# Patient Record
Sex: MALE | Race: WHITE | Employment: FULL TIME | URBAN - METROPOLITAN AREA
[De-identification: names, ages, dates, MRNs, and addresses within clinical notes are randomized per-mention and may not be internally consistent; named-entity substitution may affect disease eponyms.]

---

## 2024-09-27 ENCOUNTER — HOSPITAL ENCOUNTER (OUTPATIENT)
Dept: PHYSICAL THERAPY | Age: 29
Setting detail: THERAPIES SERIES
Discharge: HOME | End: 2024-09-27
Payer: COMMERCIAL

## 2024-09-27 ENCOUNTER — OFFICE VISIT (OUTPATIENT)
Dept: ORTHOPEDIC SURGERY | Age: 29
End: 2024-09-27
Payer: COMMERCIAL

## 2024-09-27 VITALS — WEIGHT: 170 LBS | HEIGHT: 71 IN | BODY MASS INDEX: 23.8 KG/M2

## 2024-09-27 DIAGNOSIS — S83.271D COMPLEX TEAR OF LATERAL MENISCUS OF RIGHT KNEE, SUBSEQUENT ENCOUNTER: Primary | ICD-10-CM

## 2024-09-27 PROCEDURE — 97530 THERAPEUTIC ACTIVITIES: CPT | Performed by: PHYSICAL THERAPIST

## 2024-09-27 PROCEDURE — 97112 NEUROMUSCULAR REEDUCATION: CPT | Performed by: PHYSICAL THERAPIST

## 2024-09-27 PROCEDURE — 99212 OFFICE O/P EST SF 10 MIN: CPT | Performed by: ORTHOPAEDIC SURGERY

## 2024-09-27 PROCEDURE — 97110 THERAPEUTIC EXERCISES: CPT | Performed by: PHYSICAL THERAPIST

## 2024-09-30 ENCOUNTER — APPOINTMENT (OUTPATIENT)
Dept: PHYSICAL THERAPY | Age: 29
End: 2024-09-30
Payer: COMMERCIAL

## 2024-10-10 ENCOUNTER — HOSPITAL ENCOUNTER (OUTPATIENT)
Dept: PHYSICAL THERAPY | Age: 29
Setting detail: THERAPIES SERIES
Discharge: HOME | End: 2024-10-10
Payer: COMMERCIAL

## 2024-10-10 PROCEDURE — 97530 THERAPEUTIC ACTIVITIES: CPT | Performed by: PHYSICAL THERAPIST

## 2024-10-10 PROCEDURE — 97110 THERAPEUTIC EXERCISES: CPT | Performed by: PHYSICAL THERAPIST

## 2024-10-10 PROCEDURE — 97750 PHYSICAL PERFORMANCE TEST: CPT | Performed by: PHYSICAL THERAPIST

## 2024-10-10 NOTE — FLOWSHEET NOTE
[facility]- Outpatient Rehabilitation and Therapy [address] [place] [id number] office: [phone] fax: [phone]            Physical Therapy: TREATMENT/PROGRESS NOTE   Patient: Felipe Chung (29 y.o. male)   Examination Date: 10/10/2024   :  1995 MRN: [id number]   Visit #: 41 (30 more visits approved 5/10/24-24)  Insurance Allowable Auth Needed   50 (20 + 30 extension) []Yes    [x]No    Insurance: Payor: [facility] / Plan: [facility] [place] PPO / Product Type: *No Product type* /   Insurance ID: [id number] - ([facility])  Secondary Insurance (if applicable):    Treatment Diagnosis:     Acute pain of right knee [M25.561]   Medical Diagnosis:  Acute pain of right knee [M25.561]  Acute traumatic internal derangement of right knee [S83.104]  History of repair of anterior cruciate ligament of right knee [Z98.890]  S/p right knee lateral meniscus repair and meniscectomy.  Surgery on 3/20/24   Referring Physician: Luis Enrique Burkett MD - Now Dr. Burkett PCP: No primary care provider on file.       Plan of care signed (Y/N):     Date of Patient follow up with Physician:  pt to schedule around      Progress Report/POC:   POC update due: (10 visits /OR AUTH LIMITS, whichever is less)   26 Aug 2024                                             Precautions/ Contra-indications:           Latex allergy:  NO  Pacemaker:    NO  Contraindications for Manipulation: None  Date of Surgery:  ACL repair meniscus surgery with MCL (non op MCL)   Other:    Red Flags:  None    C-SSRS Triggered by Intake questionnaire:   [x] No, Questionnaire did not trigger screening.   [] Yes, Patient intake triggered further evaluation      [] C-SSRS Screening completed  [] PCP notified via Plan of Care  [] Emergency services notified     Preferred Language for Healthcare:   [x] English       [] other:    Visit #:   Insurance Allowable Auth Needed   50 []Yes    [x]No    (30 more visits approved 5/10/24-24)

## 2024-10-17 ENCOUNTER — HOSPITAL ENCOUNTER (OUTPATIENT)
Dept: PHYSICAL THERAPY | Age: 29
Setting detail: THERAPIES SERIES
Discharge: HOME | End: 2024-10-17
Payer: COMMERCIAL

## 2024-10-17 PROCEDURE — 97110 THERAPEUTIC EXERCISES: CPT | Performed by: PHYSICAL THERAPIST

## 2024-10-17 PROCEDURE — 97530 THERAPEUTIC ACTIVITIES: CPT | Performed by: PHYSICAL THERAPIST

## 2024-10-17 PROCEDURE — 97112 NEUROMUSCULAR REEDUCATION: CPT | Performed by: PHYSICAL THERAPIST

## 2024-10-17 NOTE — PLAN OF CARE
[facility]- Outpatient Rehabilitation and Therapy [address] [place] [address] office: [phone] fax: [phone]       Physical Therapy Re-Certification Plan of Care    Dear Dr. Burkett,    We had the pleasure of treating the following patient for physical therapy services at [facility].  A summary of our findings can be found in the updated assessment below.  This includes our plan of care.  If you have any questions or concerns regarding these findings, please do not hesitate to contact me at the office phone number checked above.  Thank you for the referral.     Physician Signature:________________________________Date:__________________  By signing above (or electronic signature), therapist’s plan is approved by physician    Date Range Of Visits: 2/22/24-10/17/2024  Total Visits to Date: 42  Overall Response to Treatment:   [] Patient is responding well to treatment and improvement is noted with regards to goals   [] Patient should continue to improve in reasonable time if they continue HEP   [] Patient has plateaued and is no longer responding to skilled PT intervention    [] Patient is getting worse and would benefit from return to referring MD   [] Patient unable to adhere to initial POC   [x] Other: Pt juilan tx well.  Pt is changing his job, and we did discuss insurance changes with this.  Pt is doing well enough to transition to [employer] program, and he and I discussed what I've discussed with Dr. Burkett.  Pt was challenged aerobically with the shuttle runs today in the clinic, but he did not have any c/o pain.  Running on the treadmill was held due to some hip soreness.  I did advise the pt that if this continues, he can be seen for his hip.  Pt will be out of town next week, and I recommend returning for [employer] [facility] for a few visits every other week for guidance to return to his sport/play activities and to give guidance for his gym activities.   Pt would also

## 2024-11-13 ENCOUNTER — HOSPITAL ENCOUNTER (OUTPATIENT)
Dept: PHYSICAL THERAPY | Age: 29
Discharge: HOME | End: 2024-11-13

## 2024-11-13 NOTE — FLOWSHEET NOTE
[facility]- Outpatient Rehabilitation and Therapy [address] [place] [id number] office: [phone] fax: [phone]      Lower Extremity Daily Performance Training Note    Date:  2024    Patient Name:  Felipe Chung    :  1995  MRN: [id number]  Restrictions/Precautions:   Medical/Treatment Diagnosis Information:    Acute pain of right knee [M25.561]  Acute traumatic internal derangement of right knee [S83.104]  History of repair of anterior cruciate ligament of right knee [Z98.890]  S/p right knee lateral meniscus repair and meniscectomy.  Surgery on 3/20/24     Insurance/Certification information:       Physician Information:   : Luis Enrique Burkett MD       Pain level: /10     Visit Number: 1     Dates Paid$: -Not Paid no HSA card, plans for package     Subjective: Pt has completed skilled therapy about a month ago and has since been able to get to the gym about 1x/week over the last 4 weeks. Functionally, pt is improving and has been gradually resuming recreational activities, however, endurance and delayed muscle soreness has been present after activity. Pt was surprised by R ant hip fatigue and ache experienced following 15 holes of golf. Would like to continue with LE strengthening in [employer] with goal of returning to hiking, swimming, and recreational sports.     Objective:  Observation:   LEFS/RSI NPV   24: HHD S/L Hip ABD: R: 22.8#      L: 30.6#       Exercises:  Exercise/Equipment Resistance/Repetitions Other comments   Supine ITB stretch  3x30\" R     EOB Mod kobi stretch  3x30\" R     Pull through Figure 4  2x30\" R/L          Hooklying IR/ER Rock  1x10 ea     90/90 Hip IR-supine  2x10 Bilateral  Challenging         Mod Side Plank clam  3x5 R/L L[place] Loop     EOB Resisted hip flex plank  3x5 R/L L[place] Loop          Triple Threats  3x5          CC Hip ABD  5# 2x8 R/L     SL RDL  Airex 3x6-8 R/L          Retro TM  3' 0.5 mph     Band walks  Blue Mid shin   2x10 R/L

## 2025-01-22 ENCOUNTER — HOSPITAL ENCOUNTER (OUTPATIENT)
Dept: PHYSICAL THERAPY | Age: 30
Discharge: HOME | End: 2025-01-22

## 2025-01-22 PROCEDURE — 9990000027 HC GAP GROUP

## 2025-01-22 NOTE — FLOWSHEET NOTE
[facility]- Outpatient Rehabilitation and Therapy [address] [place] [id number] office: [phone] fax: [phone]      Lower Extremity Daily Performance Training Note    Date:  2025    Patient Name:  Felipe Chung    :  1995  MRN: [id number]  Restrictions/Precautions:   Medical/Treatment Diagnosis Information:    Acute pain of right knee [M25.561]  Acute traumatic internal derangement of right knee [S83.104]  History of repair of anterior cruciate ligament of right knee [Z98.890]  S/p right knee lateral meniscus repair and meniscectomy.  Surgery on 3/20/24     Insurance/Certification information:       Physician Information:   : Luis Enrique Burkett MD       Pain level: /10     Visit Number: 2     Dates Paid$: , 25- PAID FOR 2 VISITS     Subjective: Pt states \"Bridget been doing really good with my exercises and my knee tolerated travel for the holidays very well. Last week, I've started to have more localized pain on the outside of my knee\" and pt points toward distal ITB/prox fib head. Pt reports good compliance with HEP and denies issues with activities performed. Does admit to decreased compliance going to the gym, but pt did start a new job and is trying to figure out his schedule.       Objective:  Observation:   LEFS/RSI NPV   24: HHD S/L Hip ABD: R: 22.8#      L: 30.6#  24:  R Knee AROM Flexion: 131 supine, 138 after bike and stretches             LEFS: 65/80; 15% dysfunction             ACL-RSI: 57.5             HHD S/L Hip ABD: R: 28.5#    L: 31.3#       Exercises:  Exercise/Equipment Resistance/Repetitions Other comments   Supine ITB stretch  3x30\" R     EOB Mod kobi stretch  3x30\" R     RFESS Stretch  2x30\" R/L     Calf Stretch Off Step  2x30\" R     Pull through Figure 4          Hooklying IR/ER Rock  1x10 ea     90/90 Hip IR-EOB  2x6 Red loop  Challenging         Mod Side Plank clam     EOB Resisted hip flex plank          Triple Threats  3x5